# Patient Record
Sex: FEMALE | Race: WHITE | Employment: OTHER | URBAN - METROPOLITAN AREA
[De-identification: names, ages, dates, MRNs, and addresses within clinical notes are randomized per-mention and may not be internally consistent; named-entity substitution may affect disease eponyms.]

---

## 2017-10-26 PROCEDURE — 81001 URINALYSIS AUTO W/SCOPE: CPT | Performed by: INTERNAL MEDICINE

## 2017-10-26 PROCEDURE — 87086 URINE CULTURE/COLONY COUNT: CPT | Performed by: INTERNAL MEDICINE

## 2018-01-24 PROCEDURE — 88175 CYTOPATH C/V AUTO FLUID REDO: CPT | Performed by: OBSTETRICS & GYNECOLOGY

## 2018-01-24 PROCEDURE — 87624 HPV HI-RISK TYP POOLED RSLT: CPT | Performed by: OBSTETRICS & GYNECOLOGY

## 2019-01-25 PROCEDURE — 88175 CYTOPATH C/V AUTO FLUID REDO: CPT | Performed by: OBSTETRICS & GYNECOLOGY

## 2019-01-25 PROCEDURE — 87624 HPV HI-RISK TYP POOLED RSLT: CPT | Performed by: OBSTETRICS & GYNECOLOGY

## 2019-09-12 ENCOUNTER — OFFICE VISIT (OUTPATIENT)
Dept: URGENT CARE | Age: 84
End: 2019-09-12

## 2019-09-12 DIAGNOSIS — Z23 NEED FOR INFLUENZA VACCINATION: ICD-10-CM

## 2019-09-12 DIAGNOSIS — H61.21 IMPACTED CERUMEN OF RIGHT EAR: Primary | ICD-10-CM

## 2019-09-12 PROCEDURE — 99203 OFFICE O/P NEW LOW 30 MIN: CPT | Performed by: NURSE PRACTITIONER

## 2019-09-12 PROCEDURE — 69209 REMOVE IMPACTED EAR WAX UNI: CPT | Performed by: NURSE PRACTITIONER

## 2019-09-12 PROCEDURE — G0008 ADMIN INFLUENZA VIRUS VAC: HCPCS | Performed by: NURSE PRACTITIONER

## 2019-09-12 PROCEDURE — 90662 IIV NO PRSV INCREASED AG IM: CPT | Performed by: NURSE PRACTITIONER

## 2019-09-12 RX ORDER — ALPRAZOLAM 0.5 MG/1
TABLET ORAL
Refills: 1 | COMMUNITY
Start: 2019-07-22

## 2019-09-12 ASSESSMENT — ENCOUNTER SYMPTOMS
VOMITING: 0
PHOTOPHOBIA: 0
HEADACHES: 0
EYE REDNESS: 0
DIARRHEA: 0
RHINORRHEA: 0
SHORTNESS OF BREATH: 0
NAUSEA: 0
COUGH: 0
SORE THROAT: 0
EYE PAIN: 0
FEVER: 0
WEAKNESS: 0
ABDOMINAL PAIN: 0
DIZZINESS: 0
CONFUSION: 0
CHILLS: 0

## 2021-06-16 ENCOUNTER — OFFICE VISIT (OUTPATIENT)
Dept: HEMATOLOGY/ONCOLOGY | Facility: HOSPITAL | Age: 86
End: 2021-06-16
Attending: OBSTETRICS & GYNECOLOGY
Payer: MEDICARE

## 2021-06-16 VITALS
SYSTOLIC BLOOD PRESSURE: 160 MMHG | DIASTOLIC BLOOD PRESSURE: 80 MMHG | HEIGHT: 65.98 IN | OXYGEN SATURATION: 97 % | RESPIRATION RATE: 16 BRPM | WEIGHT: 181 LBS | HEART RATE: 69 BPM | TEMPERATURE: 97 F | BODY MASS INDEX: 29.09 KG/M2

## 2021-06-16 PROCEDURE — 99211 OFF/OP EST MAY X REQ PHY/QHP: CPT

## 2021-06-16 NOTE — CONSULTS
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 90 tablet 0   • busPIRone HCl 15 MG Oral Tab Take 15 mg by mouth 2 (two) times daily.      • Fluticasone Propionate (KAMLA tendeness  EXTREMITIES: No edema  NEUROLOGIC: Negative  Pelvic: exam chaperoned by nurse,EGBUS within normal limits,normal cervix without lesions, polyps or tenderness,uterus normal size, shape, consistency, no mass or tenderness,adnexa normal in size with

## 2021-06-16 NOTE — PROGRESS NOTES
Patient is here for Dr Cheryl Solorio consult for positive HPV. Patient was diagnosed about 15 years ago but was recently advised to follow up with the surgeon for further work up. Patient had a pap smear in March. Here to further discuss.        Education Record

## 2022-10-21 ENCOUNTER — WALK IN (OUTPATIENT)
Dept: URGENT CARE | Age: 87
End: 2022-10-21

## 2022-10-21 DIAGNOSIS — Z11.1 SCREENING-PULMONARY TB: Primary | ICD-10-CM

## 2022-10-21 PROCEDURE — 86580 TB INTRADERMAL TEST: CPT | Performed by: NURSE PRACTITIONER

## 2022-10-24 ENCOUNTER — APPOINTMENT (OUTPATIENT)
Dept: URGENT CARE | Age: 87
End: 2022-10-24

## (undated) NOTE — Clinical Note
Not a lot of good data on what to do. I suggested a cone biopsy to see if we could clear the lesion but she really was not interested in any surgery. Cannot really argue with her. I might do more frequent Pap smears may be every 6 months.   I would foreg